# Patient Record
Sex: MALE | Race: WHITE | NOT HISPANIC OR LATINO | Employment: FULL TIME | URBAN - METROPOLITAN AREA
[De-identification: names, ages, dates, MRNs, and addresses within clinical notes are randomized per-mention and may not be internally consistent; named-entity substitution may affect disease eponyms.]

---

## 2023-01-24 ENCOUNTER — OFFICE VISIT (OUTPATIENT)
Dept: URGENT CARE | Facility: CLINIC | Age: 28
End: 2023-01-24

## 2023-01-24 VITALS
SYSTOLIC BLOOD PRESSURE: 120 MMHG | HEART RATE: 55 BPM | DIASTOLIC BLOOD PRESSURE: 70 MMHG | RESPIRATION RATE: 16 BRPM | TEMPERATURE: 97.8 F | OXYGEN SATURATION: 96 %

## 2023-01-24 DIAGNOSIS — H92.02 EAR PAIN, LEFT: Primary | ICD-10-CM

## 2023-01-24 PROBLEM — M76.811: Status: ACTIVE | Noted: 2023-01-24

## 2023-01-24 PROBLEM — H52.223 REGULAR ASTIGMATISM, BILATERAL: Status: ACTIVE | Noted: 2023-01-24

## 2023-01-24 PROBLEM — M54.30 SCIATICA: Status: ACTIVE | Noted: 2023-01-24

## 2023-01-24 PROBLEM — M22.2X9 DISORDER OF PATELLOFEMORAL JOINT: Status: ACTIVE | Noted: 2017-06-30

## 2023-01-24 PROBLEM — G47.30 SLEEP APNEA: Status: ACTIVE | Noted: 2023-01-24

## 2023-01-24 PROBLEM — G44.219 EPISODIC TENSION-TYPE HEADACHE: Status: ACTIVE | Noted: 2023-01-24

## 2023-01-24 NOTE — PROGRESS NOTES
[facility]        NAME: Veena Brannon is a 24 y o  male  : 1995    MRN: [id number]  DATE: 2023  TIME: 11:57 AM    Assessment and Plan   Ear pain, left [H92 02]  1  Ear pain, left          Present with pain to the left ear with normal exam   Recommended OTC drops and/or flonase for symptoms  Follow up with primary care in 3-5 days  Go to ER if symptoms get worse  Patient Instructions     Recommended OTC drops and/or flonase for symptoms  Follow up with PCP in 3-5 days  Proceed to ER if symptoms worsen  Chief Complaint     Chief Complaint   Patient presents with   • Earache     L ear pressure, and pain with loss of hearing since           History of Present Illness       Presents with 2-3 days of ear pain/pressure and loss of hearing on the left side  Denies recent illness or current sick symptoms  No recent swimming/water to ear  No pain with moving mouth  Review of Systems   Review of Systems   Constitutional: Negative for fever  HENT: Positive for ear pain  Negative for congestion  Respiratory: Negative for cough and shortness of breath  Cardiovascular: Negative for chest pain  Psychiatric/Behavioral: Negative for confusion  Current Medications     No current outpatient medications on file  Current Allergies     Allergies as of 2023   • (No Known Allergies)            The following portions of the patient's history were reviewed and updated as appropriate: allergies, current medications, past family history, past medical history, past social history, past surgical history and problem list      History reviewed  No pertinent past medical history  History reviewed  No pertinent surgical history  History reviewed  No pertinent family history  Medications have been verified          Objective   /70 (BP Location: Right arm, Patient Position: Sitting, Cuff Size: Standard)   Pulse 55   Temp 97 8 °F (36 6 °C) (Temporal)   Resp 16   SpO2 96%        Physical Exam     Physical Exam  Vitals reviewed  Constitutional:       General: He is not in acute distress  Appearance: Normal appearance  HENT:      Right Ear: Tympanic membrane, ear canal and external ear normal       Left Ear: Tympanic membrane, ear canal and external ear normal       Nose: Nose normal       Mouth/Throat:      Mouth: Mucous membranes are moist       Pharynx: No posterior oropharyngeal erythema  Eyes:      Conjunctiva/sclera: Conjunctivae normal    Cardiovascular:      Rate and Rhythm: Normal rate and regular rhythm  Pulses: Normal pulses  Heart sounds: Normal heart sounds  No murmur heard  Pulmonary:      Effort: Pulmonary effort is normal  No respiratory distress  Breath sounds: Normal breath sounds  Skin:     General: Skin is warm and dry  Neurological:      General: No focal deficit present  Mental Status: He is alert and oriented to person, place, and time     Psychiatric:         Mood and Affect: Mood normal          Behavior: Behavior normal